# Patient Record
Sex: MALE | Race: AMERICAN INDIAN OR ALASKA NATIVE
[De-identification: names, ages, dates, MRNs, and addresses within clinical notes are randomized per-mention and may not be internally consistent; named-entity substitution may affect disease eponyms.]

---

## 2022-05-10 ENCOUNTER — HOSPITAL ENCOUNTER (EMERGENCY)
Dept: HOSPITAL 5 - ED | Age: 32
Discharge: HOME | End: 2022-05-10
Payer: OTHER GOVERNMENT

## 2022-05-10 VITALS — SYSTOLIC BLOOD PRESSURE: 141 MMHG | DIASTOLIC BLOOD PRESSURE: 108 MMHG

## 2022-05-10 DIAGNOSIS — N34.2: Primary | ICD-10-CM

## 2022-05-10 LAB
BILIRUB UR QL STRIP: (no result)
BLOOD UR QL VISUAL: (no result)
PH UR STRIP: 6 [PH] (ref 5–7)
PROT UR STRIP-MCNC: (no result) MG/DL
UROBILINOGEN UR-MCNC: < 2 MG/DL (ref ?–2)

## 2022-05-10 PROCEDURE — 99283 EMERGENCY DEPT VISIT LOW MDM: CPT

## 2022-05-10 PROCEDURE — 81001 URINALYSIS AUTO W/SCOPE: CPT

## 2022-05-10 NOTE — EMERGENCY DEPARTMENT REPORT
ED Male  HPI





- General


Chief complaint: Urogenital-Male


Stated complaint: ABD PAIN/ BURNING WITH URINATION


Time Seen by Provider: 05/10/22 12:10


Source: patient


Mode of arrival: Ambulatory


Limitations: No Limitations





- History of Present Illness


Initial comments: 





32-year-old black male with no past medical history presents to the emergency 

department for evaluation of few day history of worsening dysuria.  He states 

that about a week and a half ago he had some unprotected sex while drinking then

symptoms started a few days ago.  He states that he has also had some 

intermittent abdominal pain but denies fever, open lesions, and penile 

discharge.


MD Complaint: dysuria


-: Gradual, days(s) (2-3)


Location: penis


Severity scale (0 -10): 1


Quality: burning


Consistency: intermittent


Worsens with: urination


new sexual partner


dysuria.  denies: discharge, swelling, mass, rash, urinary retention, blood in 

urine, fever, nausea/vomiting, incontinence





- Related Data


Sexually active: Yes





ED Review of Systems


ROS: 


Stated complaint: ABD PAIN/ BURNING WITH URINATION


Other details as noted in HPI





Comment: All other systems reviewed and negative


Constitutional: denies: chills, fever


Eyes: denies: eye pain


ENT: denies: congestion


Respiratory: denies: cough, shortness of breath


Cardiovascular: denies: chest pain, palpitations


Gastrointestinal: abdominal pain.  denies: nausea, vomiting, diarrhea, 

hematemesis, melena, hematochezia


Genitourinary: denies: urgency, dysuria


Musculoskeletal: denies: back pain


Skin: denies: lesions


Neurological: denies: headache, weakness





ED Physical Exam





- General


Limitations: No Limitations


General appearance: alert, in no apparent distress





- Head


Head exam: Present: atraumatic, normocephalic





- Eye


Eye exam: Present: normal appearance.  Absent: conjunctival injection





- Neck


Neck exam: Present: normal inspection.  Absent: tenderness, full ROM, 

lymphadenopathy





- Respiratory


Respiratory exam: Present: normal lung sounds bilaterally.  Absent: respiratory 

distress, wheezes, rales, rhonchi, stridor, chest wall tenderness





- Cardiovascular


Cardiovascular Exam: Present: regular rate, normal heart sounds





- GI/Abdominal


GI/Abdominal exam: Present: soft, normal bowel sounds.  Absent: distended, 

tenderness, guarding, rebound, rigid





- 


 exam: Present: urethral discharge.  Absent: testicular tenderness, scrotal 

swelling





- Extremities Exam


Extremities exam: Present: normal inspection





- Back Exam


Back exam: Present: normal inspection.  Absent: CVA tenderness (R), CVA 

tenderness (L)





- Neurological Exam


Neurological exam: Present: alert, oriented X3





- Psychiatric


Psychiatric exam: Present: normal affect, normal mood





- Skin


Skin exam: Present: warm, dry, intact





ED Course


                                   Vital Signs











  05/10/22





  08:42


 


Temperature 97.9 F


 


Pulse Rate 80


 


Respiratory 18





Rate 


 


Blood Pressure 141/108





[Left] 


 


O2 Sat by Pulse 100





Oximetry 














ED Medical Decision Making





- Medical Decision Making





32-year-old black male with no past medical history presents to the emergency 

department for evaluation of few day history of worsening dysuria.  He states 

that about a week and a half ago he had some unprotected sex while drinking then

 symptoms started a few days ago.  He states that he has also had some 

intermittent abdominal pain but denies fever, open lesions, and penile 

discharge.





Symptoms and assessment consistent with urethritis that is probably related to 

STI.  Patient will be treated with one-time dose of Rocephin 500 mg IM along 

with azithromycin 1 g p.o.  Patient refused doxycycline prescription stating 

that it makes him have nightmares.  Sample sent for GC chlamydia.  He is advised

 to refrain from sex until results received, always practice safe sex, and 

follow-up with his primary care provider if no improvement or worsening 

symptoms.  He verbalized understanding of and agreement with plan of care.


Critical care attestation.: 


If time is entered above; I have spent that time in minutes in the direct care 

of this critically ill patient, excluding procedure time.








ED Disposition


Clinical Impression: 


 Urethritis





Disposition: 01 HOME / SELF CARE / HOMELESS


Is pt being admited?: No


Does the pt Need Aspirin: No


Condition: Stable


Instructions:  Urethritis, Adult, Safe Sex


Additional Instructions: 


Practice safe sex.  Follow-up with primary care provider for further evaluation 

and management.  Return to the emergency department as needed.


Referrals: 


[facility] [Outside] - 3-5 Days


MELITA ALLEN MD [Staff Physician] - 3-5 Days


Forms:  STI Treatment and Prevention


Time of Disposition: 12:23